# Patient Record
Sex: FEMALE | Race: WHITE
[De-identification: names, ages, dates, MRNs, and addresses within clinical notes are randomized per-mention and may not be internally consistent; named-entity substitution may affect disease eponyms.]

---

## 2022-08-05 PROBLEM — Z00.00 ENCOUNTER FOR PREVENTIVE HEALTH EXAMINATION: Status: ACTIVE | Noted: 2022-08-05

## 2022-08-09 ENCOUNTER — APPOINTMENT (OUTPATIENT)
Dept: ENDOCRINOLOGY | Facility: CLINIC | Age: 47
End: 2022-08-09

## 2022-08-09 VITALS
HEIGHT: 69 IN | DIASTOLIC BLOOD PRESSURE: 80 MMHG | OXYGEN SATURATION: 98 % | SYSTOLIC BLOOD PRESSURE: 118 MMHG | HEART RATE: 85 BPM

## 2022-08-09 DIAGNOSIS — Z78.9 OTHER SPECIFIED HEALTH STATUS: ICD-10-CM

## 2022-08-09 DIAGNOSIS — Z87.19 PERSONAL HISTORY OF OTHER DISEASES OF THE DIGESTIVE SYSTEM: ICD-10-CM

## 2022-08-09 DIAGNOSIS — E04.2 NONTOXIC MULTINODULAR GOITER: ICD-10-CM

## 2022-08-09 PROCEDURE — 99203 OFFICE O/P NEW LOW 30 MIN: CPT

## 2022-08-09 NOTE — REVIEW OF SYSTEMS
Left detailed voicemail to return my call to office to reschedule physical exam for when Dr Carr is in clinic   [As Noted in HPI] : as noted in HPI

## 2022-08-09 NOTE — HISTORY OF PRESENT ILLNESS
[FreeTextEntry1] : here for second opinion, has been seeing another endo, Dr. Vega, for thyroid nodules\par \par Quality: bilateral thyroid nodules\par Duration: 2015\par Onset: felt on exam and sonogram showed nodules\par Severity: mild\par Associated symptoms: denies anterior neck pain, dysphagia or voice changes\par \par MODIFYING FACTORS: \par Left thyroid nodule benign FNA in 2015 and again 2019 due to increased size. \par no family history of thyroid nodules/disease\par she doesn’t take thyroid meds/herbal meds\par no known radiation exposure to the neck\par \par \par

## 2022-08-09 NOTE — PHYSICAL EXAM
[Alert] : alert [No Acute Distress] : no acute distress [EOMI] : extra ocular movement intact [No LAD] : no lymphadenopathy [No Accessory Muscle Use] : no accessory muscle use [Clear to Auscultation] : lungs were clear to auscultation bilaterally [Normal S1, S2] : normal S1 and S2 [Normal Rate] : heart rate was normal [Normal Reflexes] : deep tendon reflexes were 2+ and symmetric [No Tremors] : no tremors [Oriented x3] : oriented to person, place, and time [Normal Affect] : the affect was normal [Normal Insight/Judgement] : insight and judgment were intact [Normal Mood] : the mood was normal [de-identified] : Left thyroid nodule - firm, nontender. No tracheal deviation. Negative Boling's sign

## 2022-08-09 NOTE — ASSESSMENT
[FreeTextEntry1] : 47 year old female with bilateral thyroid nodules, dominant nodule on Left benign benign FNA in 2015 (by history) and again in 2019 ( radiology). Prior thyroid sono reports reviewed - overall nodules stable in size except Left nodule which has increased in size over the years (reason for FNA in 2019) but since then has been stable in size.  She is asymptomatic and nodules do not cause compressive symptoms.\par - monitor nodules with sonogram every 6 months, monitor TSH levels (recent labs pending)\par - consider surgical eval if nodules increased size and/or causes compressive symptoms\par - all questions answered

## 2022-08-09 NOTE — DATA REVIEWED
[FreeTextEntry1] : Thyroid sonogram 3/17/22\par RMP nodule [id number] cm - smaller, mixed cystic/solid\par RLP colloid cyst 3x3x2 mm - stable\par LMP nodule 10x7x3 mm - heterogeneous, hypoechoic, stable\par LLP nodule 7x7x6 mm - spongiform, stable\par LMP nodule 62o48x08 mm - heterogeneous, hypoechoic, stable (FNA 2019, benign, cat 2

## 2023-02-21 ENCOUNTER — APPOINTMENT (OUTPATIENT)
Dept: ENDOCRINOLOGY | Facility: CLINIC | Age: 48
End: 2023-02-21

## 2024-06-14 ENCOUNTER — APPOINTMENT (OUTPATIENT)
Dept: BREAST CENTER | Facility: CLINIC | Age: 49
End: 2024-06-14
Payer: COMMERCIAL

## 2024-06-14 VITALS — BODY MASS INDEX: 32.58 KG/M2 | WEIGHT: 220 LBS | HEIGHT: 69 IN

## 2024-06-14 DIAGNOSIS — D05.01 LOBULAR CARCINOMA IN SITU OF RIGHT BREAST: ICD-10-CM

## 2024-06-14 DIAGNOSIS — N64.89 OTHER SPECIFIED DISORDERS OF BREAST: ICD-10-CM

## 2024-06-14 PROCEDURE — 76641 ULTRASOUND BREAST COMPLETE: CPT | Mod: 50

## 2024-06-14 PROCEDURE — 99204 OFFICE O/P NEW MOD 45 MIN: CPT | Mod: 25

## 2024-06-14 NOTE — PROCEDURE
[FreeTextEntry3] : Bilateral breast ultrasound  The patient has a seroma in the right breast  Four-quadrant survey the right breast demonstrates no developing mass  Four-quadrant survey the left breast demonstrates no developing mass  There is no suspicious lymphadenopathy  BI-RADS 2

## 2024-06-14 NOTE — PHYSICAL EXAM
[Normocephalic] : normocephalic [Sclera nonicteric] : sclera nonicteric [No Supraclavicular Adenopathy] : no supraclavicular adenopathy [Clear to Auscultation Bilat] : clear to auscultation bilaterally [Normal Sinus Rhythm] : normal sinus rhythm [No Axillary Lymphadenopathy] : no left axillary lymphadenopathy [Soft] : abdomen soft [No Edema] : no edema [No Rashes] : no rashes [No Ulceration] : no ulceration [de-identified] : Status post bilateral breast reduction

## 2024-06-14 NOTE — ASSESSMENT
[FreeTextEntry1] : New diagnosis of right breast lobular carcinoma in situ  8 atypical lobular hyperplasia identified  This was excised  The patient has no family history for breast cancer  She was assisted in scheduling breast MRI  She was advised medical oncology evaluation after the MRI to discuss risk reduction medications  A total of 45 minutes was spent in consultation evaluation review

## 2024-06-14 NOTE — HISTORY OF PRESENT ILLNESS
[FreeTextEntry1] : Referred by Dr. Carney. Patient recently had a breast reduction 1 year ago and developed necrosis. Patient had excisional biopsy on the right breast and reported lobular carcinoma in situ and ALH.    She has no family history for breast cancer  Last mammogram was January 2024 chest pain

## 2024-08-05 ENCOUNTER — NON-APPOINTMENT (OUTPATIENT)
Age: 49
End: 2024-08-05

## 2024-08-18 ENCOUNTER — OUTPATIENT (OUTPATIENT)
Dept: OUTPATIENT SERVICES | Facility: HOSPITAL | Age: 49
LOS: 1 days | Discharge: HOME | End: 2024-08-18

## 2024-08-18 DIAGNOSIS — C50.919 MALIGNANT NEOPLASM OF UNSPECIFIED SITE OF UNSPECIFIED FEMALE BREAST: ICD-10-CM

## 2024-08-26 ENCOUNTER — APPOINTMENT (OUTPATIENT)
Dept: HEMATOLOGY ONCOLOGY | Facility: CLINIC | Age: 49
End: 2024-08-26

## 2024-08-26 ENCOUNTER — NON-APPOINTMENT (OUTPATIENT)
Age: 49
End: 2024-08-26

## 2024-08-26 DIAGNOSIS — D05.01 LOBULAR CARCINOMA IN SITU OF RIGHT BREAST: ICD-10-CM

## 2024-08-26 NOTE — HISTORY OF PRESENT ILLNESS
[de-identified] : Estela Meng is a 49 yr old pre menopausal  female with LCIS/ALH diagnosed at age 49  She has a h/o breast reduction 1 year ago and developed necrosis. She underwent a right breast mass excisional biopsy on 6/1/24 which demonstrated LCIS/ALH.  Imaging/Pathology:   6/3/24 s/p Right breast mass excisional biopsy Pathology: -Lobular carcinoma in situ, classical type, focal -Atypical lobular hyperplasia (ALH) -Breast parenchyma with organizing fat necrosis and calcifications -Focal mild chronic inflammation and multinucleated giant cells, consistent with previous biopsy site change -Fibrocystic changes including florid duct hyperplasia, apocrine metaplasia, stromal fibrosis and cyst formation -Immunostain for E-cadherin appears negative in the foci of LCIS and ALH, supporting diagnosis   8/1/24 MRI Michael. Breast-No MRI findings suspicious for malignancy BI-RADS 2 32mm cysyt in the left lower lobe of the liver  FH of cancer: None ObGyn hx:Menarche at 10 G1 LMP 6/1/24 Denies breast feeding, birth control, or HRT   Care Team Breast surgery: Dr. Adilson Hillman

## 2024-08-26 NOTE — ADDENDUM
[FreeTextEntry1] : Documented by Froilan Pearson acting as scribe for Dr. Brennan on 08/26/2024.   All Medical record entries made by the Scribe were at my, Dr. Brennan's, direction and personally dictated by me on 08/26/2024. I have reviewed the chart and agree that the record accurately reflects my personal performance of the history, physical exam, assessment and plan. I have also personally directed, reviewed, and agreed with the discharge instructions.

## 2025-01-17 ENCOUNTER — OUTPATIENT (OUTPATIENT)
Dept: OUTPATIENT SERVICES | Facility: HOSPITAL | Age: 50
LOS: 1 days | Discharge: HOME | End: 2025-01-17

## 2025-01-17 DIAGNOSIS — C50.919 MALIGNANT NEOPLASM OF UNSPECIFIED SITE OF UNSPECIFIED FEMALE BREAST: ICD-10-CM

## 2025-01-18 ENCOUNTER — NON-APPOINTMENT (OUTPATIENT)
Age: 50
End: 2025-01-18

## 2025-01-22 DIAGNOSIS — R92.1 MAMMOGRAPHIC CALCIFICATION FOUND ON DIAGNOSTIC IMAGING OF BREAST: ICD-10-CM

## 2025-01-27 ENCOUNTER — APPOINTMENT (OUTPATIENT)
Dept: HEMATOLOGY ONCOLOGY | Facility: CLINIC | Age: 50
End: 2025-01-27

## 2025-02-03 ENCOUNTER — NON-APPOINTMENT (OUTPATIENT)
Age: 50
End: 2025-02-03

## 2025-02-03 ENCOUNTER — APPOINTMENT (OUTPATIENT)
Dept: HEMATOLOGY ONCOLOGY | Facility: CLINIC | Age: 50
End: 2025-02-03
Payer: COMMERCIAL

## 2025-02-03 VITALS
HEIGHT: 69 IN | BODY MASS INDEX: 34.92 KG/M2 | SYSTOLIC BLOOD PRESSURE: 110 MMHG | WEIGHT: 235.76 LBS | HEART RATE: 70 BPM | DIASTOLIC BLOOD PRESSURE: 79 MMHG | TEMPERATURE: 97.9 F | OXYGEN SATURATION: 97 %

## 2025-02-03 PROCEDURE — 99205 OFFICE O/P NEW HI 60 MIN: CPT

## 2025-02-03 PROCEDURE — G2211 COMPLEX E/M VISIT ADD ON: CPT | Mod: NC

## 2025-02-06 ENCOUNTER — APPOINTMENT (OUTPATIENT)
Dept: BREAST CENTER | Facility: CLINIC | Age: 50
End: 2025-02-06

## 2025-02-06 VITALS
BODY MASS INDEX: 34.8 KG/M2 | HEIGHT: 69 IN | WEIGHT: 235 LBS | DIASTOLIC BLOOD PRESSURE: 86 MMHG | SYSTOLIC BLOOD PRESSURE: 120 MMHG

## 2025-02-06 DIAGNOSIS — D05.01 LOBULAR CARCINOMA IN SITU OF RIGHT BREAST: ICD-10-CM

## 2025-02-06 PROCEDURE — 76641 ULTRASOUND BREAST COMPLETE: CPT | Mod: 50

## 2025-02-06 PROCEDURE — 99213 OFFICE O/P EST LOW 20 MIN: CPT | Mod: 25

## 2025-08-14 ENCOUNTER — APPOINTMENT (OUTPATIENT)
Dept: BREAST CENTER | Facility: CLINIC | Age: 50
End: 2025-08-14

## 2025-08-19 ENCOUNTER — NON-APPOINTMENT (OUTPATIENT)
Age: 50
End: 2025-08-19

## 2025-08-27 ENCOUNTER — NON-APPOINTMENT (OUTPATIENT)
Age: 50
End: 2025-08-27